# Patient Record
Sex: MALE | Race: ASIAN | URBAN - METROPOLITAN AREA
[De-identification: names, ages, dates, MRNs, and addresses within clinical notes are randomized per-mention and may not be internally consistent; named-entity substitution may affect disease eponyms.]

---

## 2023-06-22 ENCOUNTER — OFFICE VISIT (OUTPATIENT)
Age: 83
End: 2023-06-22

## 2023-06-22 ENCOUNTER — HOSPITAL ENCOUNTER (OUTPATIENT)
Facility: HOSPITAL | Age: 83
Setting detail: SPECIMEN
Discharge: HOME | End: 2023-06-25

## 2023-06-22 VITALS
RESPIRATION RATE: 16 BRPM | BODY MASS INDEX: 26.87 KG/M2 | DIASTOLIC BLOOD PRESSURE: 76 MMHG | WEIGHT: 146 LBS | TEMPERATURE: 97.2 F | OXYGEN SATURATION: 100 % | HEART RATE: 73 BPM | SYSTOLIC BLOOD PRESSURE: 123 MMHG | HEIGHT: 62 IN

## 2023-06-22 DIAGNOSIS — R05.9 COUGH, UNSPECIFIED TYPE: ICD-10-CM

## 2023-06-22 DIAGNOSIS — Z00.00 ROUTINE GENERAL MEDICAL EXAMINATION AT A HEALTH CARE FACILITY: ICD-10-CM

## 2023-06-22 DIAGNOSIS — Z00.00 ROUTINE GENERAL MEDICAL EXAMINATION AT A HEALTH CARE FACILITY: Primary | ICD-10-CM

## 2023-06-22 DIAGNOSIS — R42 DIZZINESS: ICD-10-CM

## 2023-06-22 LAB
ALBUMIN SERPL-MCNC: 4 G/DL (ref 3.4–5)
ALBUMIN/GLOB SERPL: 1 (ref 0.8–1.7)
ALP SERPL-CCNC: 81 U/L (ref 45–117)
ALT SERPL-CCNC: 40 U/L (ref 16–61)
ANION GAP SERPL CALC-SCNC: 3 MMOL/L (ref 3–18)
AST SERPL-CCNC: 24 U/L (ref 10–38)
BASOPHILS # BLD: 0 K/UL (ref 0–0.1)
BASOPHILS NFR BLD: 0 % (ref 0–2)
BILIRUB SERPL-MCNC: 0.5 MG/DL (ref 0.2–1)
BUN SERPL-MCNC: 17 MG/DL (ref 7–18)
BUN/CREAT SERPL: 15 (ref 12–20)
CALCIUM SERPL-MCNC: 9.5 MG/DL (ref 8.5–10.1)
CHLORIDE SERPL-SCNC: 105 MMOL/L (ref 100–111)
CHOLEST SERPL-MCNC: 153 MG/DL
CO2 SERPL-SCNC: 32 MMOL/L (ref 21–32)
CREAT SERPL-MCNC: 1.12 MG/DL (ref 0.6–1.3)
DIFFERENTIAL METHOD BLD: ABNORMAL
EOSINOPHIL # BLD: 0.1 K/UL (ref 0–0.4)
EOSINOPHIL NFR BLD: 2 % (ref 0–5)
ERYTHROCYTE [DISTWIDTH] IN BLOOD BY AUTOMATED COUNT: 12.5 % (ref 11.6–14.5)
EST. AVERAGE GLUCOSE BLD GHB EST-MCNC: 108 MG/DL
GLOBULIN SER CALC-MCNC: 3.9 G/DL (ref 2–4)
GLUCOSE SERPL-MCNC: 172 MG/DL (ref 74–99)
HBA1C MFR BLD: 5.4 % (ref 4.2–5.6)
HCT VFR BLD AUTO: 48.4 % (ref 36–48)
HDLC SERPL-MCNC: 64 MG/DL (ref 40–60)
HDLC SERPL: 2.4 (ref 0–5)
HGB BLD-MCNC: 14.8 G/DL (ref 13–16)
IMM GRANULOCYTES # BLD AUTO: 0 K/UL (ref 0–0.04)
IMM GRANULOCYTES NFR BLD AUTO: 0 % (ref 0–0.5)
LDLC SERPL CALC-MCNC: 75.6 MG/DL (ref 0–100)
LIPID PANEL: ABNORMAL
LYMPHOCYTES # BLD: 1.3 K/UL (ref 0.9–3.6)
LYMPHOCYTES NFR BLD: 24 % (ref 21–52)
MCH RBC QN AUTO: 28.2 PG (ref 24–34)
MCHC RBC AUTO-ENTMCNC: 30.6 G/DL (ref 31–37)
MCV RBC AUTO: 92.2 FL (ref 78–100)
MONOCYTES # BLD: 0.3 K/UL (ref 0.05–1.2)
MONOCYTES NFR BLD: 5 % (ref 3–10)
NEUTS SEG # BLD: 3.6 K/UL (ref 1.8–8)
NEUTS SEG NFR BLD: 68 % (ref 40–73)
NRBC # BLD: 0 K/UL (ref 0–0.01)
NRBC BLD-RTO: 0 PER 100 WBC
PLATELET # BLD AUTO: 159 K/UL (ref 135–420)
PMV BLD AUTO: 11.3 FL (ref 9.2–11.8)
POTASSIUM SERPL-SCNC: 4.5 MMOL/L (ref 3.5–5.5)
PROT SERPL-MCNC: 7.9 G/DL (ref 6.4–8.2)
RBC # BLD AUTO: 5.25 M/UL (ref 4.35–5.65)
SODIUM SERPL-SCNC: 140 MMOL/L (ref 136–145)
TRIGL SERPL-MCNC: 67 MG/DL
TSH SERPL DL<=0.05 MIU/L-ACNC: 1.5 UIU/ML (ref 0.36–3.74)
VLDLC SERPL CALC-MCNC: 13.4 MG/DL
WBC # BLD AUTO: 5.3 K/UL (ref 4.6–13.2)

## 2023-06-22 PROCEDURE — 80061 LIPID PANEL: CPT

## 2023-06-22 PROCEDURE — 85025 COMPLETE CBC W/AUTO DIFF WBC: CPT

## 2023-06-22 PROCEDURE — 99202 OFFICE O/P NEW SF 15 MIN: CPT | Performed by: FAMILY MEDICINE

## 2023-06-22 PROCEDURE — 1123F ACP DISCUSS/DSCN MKR DOCD: CPT | Performed by: FAMILY MEDICINE

## 2023-06-22 PROCEDURE — 84443 ASSAY THYROID STIM HORMONE: CPT

## 2023-06-22 PROCEDURE — 36415 COLL VENOUS BLD VENIPUNCTURE: CPT

## 2023-06-22 PROCEDURE — 83036 HEMOGLOBIN GLYCOSYLATED A1C: CPT

## 2023-06-22 PROCEDURE — 80053 COMPREHEN METABOLIC PANEL: CPT

## 2023-06-22 RX ORDER — CHLORAL HYDRATE 500 MG
CAPSULE ORAL DAILY
COMMUNITY

## 2023-06-22 RX ORDER — FLUTICASONE PROPIONATE 50 MCG
1 SPRAY, SUSPENSION (ML) NASAL DAILY
Qty: 16 G | Refills: 0 | Status: SHIPPED | OUTPATIENT
Start: 2023-06-22

## 2023-06-22 NOTE — PROGRESS NOTES
HPI  Yolanda Howard is a [de-identified] y.o. male being seen today for   Chief Complaint   Patient presents with    H&P    Labs Only    Dizziness   . IOV for this pt to care a Martha Pinto.   he states that he is here for a checkup. Basically healthy. No meds except fish oil. Appetite is good. He has lost some weight in the last 10 years, but  now is stable. Goes to sleep at midnight. Wakes at 7 or 8. He does wake up in the night to take care of his wife who had a stroke but he feels like he gets enough rest.   Too busy to exercise much. Sometimes walks with his wife but it is not too far or too fast due to her limitations. No chest pain. No shortness of breath. Does have a cough for a few years. Has some mucus and he coughs it up. Also some nose congestion at times. Never a smoker. Occasionally dizzy. His hearing is pretty normal.         ROS  Patient states that he is feeling well. Denies complaints of chest pain, shortness of breath, swelling of legs, or weakness. he denies nausea, vomiting or diarrhea. Current Outpatient Medications   Medication Sig    Omega-3 Fatty Acids (FISH OIL) 1000 MG capsule Take by mouth daily    fluticasone (FLONASE) 50 MCG/ACT nasal spray 1 spray by Each Nostril route daily    carbamide peroxide (DEBROX) 6.5 % otic solution Place 5 drops into both ears 2 times daily     No current facility-administered medications for this visit. PE  /76 (Site: Right Upper Arm, Position: Sitting, Cuff Size: Medium Adult)   Pulse 73   Temp 97.2 °F (36.2 °C) (Temporal)   Resp 16   Ht 5' 2\" (1.575 m)   Wt 146 lb (66.2 kg)   SpO2 100%   BMI 26.70 kg/m²      Alert and oriented with normal mood and affect. he is well developed and well nourished . Bilateral ear canals are about 80% blocked by cerumen. OP/PP clear. Lungs are clear without wheezing. Heart rate is regular without murmurs or gallops. There is no lower extremity edema.      No results found for this visit on

## 2023-06-22 NOTE — PROGRESS NOTES
Patient presents for lab draw ordered by:    Ordering Provider:  Dr. Judy Jang  Ordering Department/Practice:  [facility]  Phone:  [phone]  Date Ordered:  6/22/2023    The following labs were drawn and sent to [facility] by Felisha Disla:    A1c CMP CBC Lip TSH    The following tubes were sent:    2 gold 1 lava    Draw site left brachial.  Patient tolerated draw with no distress.

## 2023-06-22 NOTE — PROGRESS NOTES
Discharge instructions reviewed with patient via  We He (relative)    Medication list and understanding of medications reviewed with patientvia  We He (relative) . OTC and herbal medications reviewed and added to med list if applicable  Barriers to adherence assessed. Guidance given regarding new medications this visit, including reason for taking this medicine, and common side effects. AVS given to patient relative We He. Explained to patient via  We Alejandro (relative). Patient expressed understandingvia  We Alejandro (relative) .